# Patient Record
Sex: MALE | Race: BLACK OR AFRICAN AMERICAN | NOT HISPANIC OR LATINO | Employment: STUDENT | URBAN - METROPOLITAN AREA
[De-identification: names, ages, dates, MRNs, and addresses within clinical notes are randomized per-mention and may not be internally consistent; named-entity substitution may affect disease eponyms.]

---

## 2021-12-27 VITALS
WEIGHT: 150 LBS | RESPIRATION RATE: 16 BRPM | BODY MASS INDEX: 23.54 KG/M2 | DIASTOLIC BLOOD PRESSURE: 62 MMHG | SYSTOLIC BLOOD PRESSURE: 133 MMHG | HEART RATE: 109 BPM | HEIGHT: 67 IN | OXYGEN SATURATION: 98 %

## 2021-12-27 PROCEDURE — 99283 EMERGENCY DEPT VISIT LOW MDM: CPT

## 2021-12-27 RX ORDER — LEVOTHYROXINE SODIUM 100 UG/1
100 CAPSULE ORAL
COMMUNITY

## 2021-12-28 ENCOUNTER — HOSPITAL ENCOUNTER (EMERGENCY)
Facility: HOSPITAL | Age: 21
Discharge: HOME | End: 2021-12-28
Attending: EMERGENCY MEDICINE

## 2021-12-28 ENCOUNTER — APPOINTMENT (EMERGENCY)
Dept: RADIOLOGY | Facility: HOSPITAL | Age: 21
End: 2021-12-28

## 2021-12-28 DIAGNOSIS — S51.819A FOREARM LACERATION: Primary | ICD-10-CM

## 2021-12-28 PROCEDURE — 99284 EMERGENCY DEPT VISIT MOD MDM: CPT | Performed by: EMERGENCY MEDICINE

## 2021-12-28 PROCEDURE — 73110 X-RAY EXAM OF WRIST: CPT

## 2021-12-28 PROCEDURE — 12004 RPR S/N/AX/GEN/TRK7.6-12.5CM: CPT | Performed by: EMERGENCY MEDICINE

## 2021-12-28 RX ORDER — IBUPROFEN 400 MG/1
800 TABLET ORAL ONCE
Status: COMPLETED | OUTPATIENT
Start: 2021-12-28 | End: 2021-12-28

## 2021-12-28 RX ORDER — CEPHALEXIN 500 MG/1
500 CAPSULE ORAL ONCE
Status: COMPLETED | OUTPATIENT
Start: 2021-12-28 | End: 2021-12-28

## 2021-12-28 RX ORDER — CEPHALEXIN 500 MG/1
500 CAPSULE ORAL EVERY 6 HOURS SCHEDULED
Qty: 28 CAPSULE | Refills: 0 | Status: SHIPPED | OUTPATIENT
Start: 2021-12-28 | End: 2022-01-04

## 2021-12-28 RX ORDER — GINSENG 100 MG
2 CAPSULE ORAL ONCE
Status: COMPLETED | OUTPATIENT
Start: 2021-12-28 | End: 2021-12-28

## 2021-12-28 RX ORDER — LIDOCAINE HYDROCHLORIDE AND EPINEPHRINE 10; 10 MG/ML; UG/ML
20 INJECTION, SOLUTION INFILTRATION; PERINEURAL ONCE
Status: COMPLETED | OUTPATIENT
Start: 2021-12-28 | End: 2021-12-28

## 2021-12-28 RX ADMIN — BACITRACIN 2 SMALL APPLICATION: 500 OINTMENT TOPICAL at 02:11

## 2021-12-28 RX ADMIN — IBUPROFEN 800 MG: 400 TABLET, FILM COATED ORAL at 01:03

## 2021-12-28 RX ADMIN — CEPHALEXIN 500 MG: 500 CAPSULE ORAL at 02:16

## 2021-12-28 RX ADMIN — LIDOCAINE HYDROCHLORIDE,EPINEPHRINE BITARTRATE 20 ML: 10; .01 INJECTION, SOLUTION INFILTRATION; PERINEURAL at 01:18

## 2021-12-28 NOTE — ED NOTES
Left wrist splint applied pt reports verbal understanding of application of device        Erasto Jung RN  12/28/21 0115

## 2021-12-28 NOTE — ED PROVIDER NOTES
History  Chief Complaint   Patient presents with    Laceration     Patient reports he was skiing and lacerated his left hand        Large laceration over the left distal forearm  Another person's skis lacerated his forearm    No other injuries    Tetnus status: utd          History provided by:  Patient  Laceration  Location:  Shoulder/arm  Shoulder/arm laceration location:  L forearm  Length:  8  Depth:  Cutaneous  Bleeding: venous and controlled    Pain details:     Quality:  Aching  Foreign body present:  No foreign bodies  Relieved by:  Nothing  Worsened by: Movement  Tetanus status:  Up to date  Associated symptoms: no fever, no focal weakness, no rash, no redness, no swelling and no streaking        Prior to Admission Medications   Prescriptions Last Dose Informant Patient Reported? Taking? Levothyroxine Sodium 100 MCG CAPS   Yes No   Sig: Take 100 mg by mouth      Facility-Administered Medications: None       History reviewed  No pertinent past medical history  History reviewed  No pertinent surgical history  History reviewed  No pertinent family history  I have reviewed and agree with the history as documented  E-Cigarette/Vaping     E-Cigarette/Vaping Substances     Social History     Tobacco Use    Smoking status: Never Smoker    Smokeless tobacco: Never Used   Substance Use Topics    Alcohol use: Not Currently    Drug use: Not Currently       Review of Systems   Constitutional: Negative for fatigue and fever  Gastrointestinal: Negative for nausea and vomiting  Musculoskeletal: Negative for arthralgias, back pain, gait problem, myalgias, neck pain and neck stiffness  Skin: Positive for wound (large laceration over the Left forearm )  Negative for rash  Neurological: Negative for focal weakness, weakness, light-headedness, numbness and headaches  All other systems reviewed and are negative        Physical Exam  Physical Exam  Constitutional:       General: He is not in acute distress  Appearance: He is well-developed  He is not ill-appearing, toxic-appearing or diaphoretic  HENT:      Head: Normocephalic and atraumatic  Right Ear: External ear normal       Left Ear: External ear normal       Nose: Nose normal       Mouth/Throat:      Pharynx: No oropharyngeal exudate  Eyes:      General: No scleral icterus  Right eye: No discharge  Left eye: No discharge  Extraocular Movements: Extraocular movements intact  Conjunctiva/sclera: Conjunctivae normal       Pupils: Pupils are equal, round, and reactive to light  Neck:      Vascular: No JVD  Trachea: No tracheal deviation  Cardiovascular:      Rate and Rhythm: Normal rate and regular rhythm  Heart sounds: Normal heart sounds  No murmur heard  No friction rub  No gallop  Pulmonary:      Effort: Pulmonary effort is normal  No respiratory distress  Breath sounds: Normal breath sounds  No stridor  No wheezing, rhonchi or rales  Chest:      Chest wall: No tenderness  Abdominal:      General: Bowel sounds are normal  There is no distension  Palpations: Abdomen is soft  There is no mass  Tenderness: There is no abdominal tenderness  There is no right CVA tenderness, left CVA tenderness, guarding or rebound  Hernia: No hernia is present  Musculoskeletal:         General: Signs of injury (left distal forearm, dorsal, 8cm irregular laceration ) present  No tenderness or deformity  Normal range of motion  Cervical back: Normal range of motion and neck supple  No rigidity or tenderness  Right lower leg: No edema  Left lower leg: No edema  Lymphadenopathy:      Cervical: No cervical adenopathy  Skin:     General: Skin is warm  Capillary Refill: Capillary refill takes less than 2 seconds  Coloration: Skin is not jaundiced or pale  Findings: No bruising, erythema or rash  Neurological:      General: No focal deficit present        Mental Status: He is alert and oriented to person, place, and time  Mental status is at baseline  Cranial Nerves: No cranial nerve deficit  Sensory: No sensory deficit  Motor: No weakness or abnormal muscle tone  Coordination: Coordination normal       Gait: Gait normal    Psychiatric:         Mood and Affect: Mood normal          Behavior: Behavior normal          Thought Content: Thought content normal          Judgment: Judgment normal          Vital Signs  ED Triage Vitals [12/27/21 2129]   Temp Pulse Respirations Blood Pressure SpO2   -- (!) 109 16 133/62 98 %      Temp src Heart Rate Source Patient Position - Orthostatic VS BP Location FiO2 (%)   -- -- -- -- --      Pain Score       1           Vitals:    12/27/21 2129   BP: 133/62   Pulse: (!) 109         Visual Acuity      ED Medications  Medications   lidocaine-epinephrine (XYLOCAINE/EPINEPHRINE) 1 %-1:100,000 injection 20 mL (20 mL Infiltration Given by Other 12/28/21 0118)   ibuprofen (MOTRIN) tablet 800 mg (800 mg Oral Given 12/28/21 0103)   bacitracin topical ointment 2 small application (2 small application Topical Given 12/28/21 0211)   cephalexin (KEFLEX) capsule 500 mg (500 mg Oral Given 12/28/21 0216)       Diagnostic Studies  Results Reviewed     None                 XR wrist 3+ views LEFT   ED Interpretation by David Pineda MD (12/28 0251)   NO ACUTE FINDINGS  NO FX      Final Result by Frances Infante DO (12/28 2144)      No acute osseous abnormality  Dorsal soft tissue swelling/laceration        Workstation performed: [id number]                    Procedures  Procedures         ED Course  ED Course as of 01/04/22 0443   Tue Dec 28, 2021   0230 Following copious irrigation ,pt consented to injection w/ lidocain and lac repair  21 sutures placed  Pt tolerated well   0252 X ray wnl  Will dc w/ Splint                                             MDM    Disposition  Final diagnoses:   Forearm laceration     Time reflects when diagnosis was documented in both MDM as applicable and the Disposition within this note     Time User Action Codes Description Comment    12/28/2021  2:53 AM Chaz Robleslon Add [Q65 [id number]] Forearm laceration       ED Disposition     ED Disposition Condition Date/Time Comment    Discharge Stable Tue Dec 28, 2021  2:53 AM Parth Benjamin discharge to home/self care  Follow-up Information    None         Discharge Medication List as of 12/28/2021  3:03 AM      START taking these medications    Details   cephalexin (KEFLEX) 500 mg capsule Take 1 capsule (500 mg total) by mouth every 6 (six) hours for 7 days, Starting Tue 12/28/2021, Until Tue 1/4/2022, Normal         CONTINUE these medications which have NOT CHANGED    Details   Levothyroxine Sodium 100 MCG CAPS Take 100 mg by mouth, Historical Med             No discharge procedures on file      PDMP Review     None          ED Provider  Electronically Signed by           Jamaal Sanchez MD  01/04/22 4486

## 2021-12-28 NOTE — ED PROCEDURE NOTE
PROCEDURE  Laceration repair    Date/Time: 12/28/2021 2:30 AM  Performed by: Neema Vides MD  Authorized by: Neema Vides MD   Consent: Verbal consent obtained  Risks and benefits: risks, benefits and alternatives were discussed  Consent given by: patient  Patient understanding: patient states understanding of the procedure being performed  Patient consent: the patient's understanding of the procedure matches consent given  Patient identity confirmed: verbally with patient  Time out: Immediately prior to procedure a "time out" was called to verify the correct patient, procedure, equipment, support staff and site/side marked as required  Body area: upper extremity  Laceration length: 8 cm  Contaminated: none   Foreign bodies: no foreign bodies  Tendon involvement: none  Nerve involvement: none  Vascular damage: no  Anesthesia: local infiltration    Anesthesia:  Local Anesthetic: lidocaine 1% with epinephrine  Anesthetic total: 12 mL    Wound Dehiscence:    Secondary closure or dehiscence: complex    Procedure Details:  Preparation: Patient was prepped and draped in the usual sterile fashion    Irrigation solution: saline  Amount of cleaning: extensive  Skin closure: Ethilon (4-0 and 6-0)  Number of sutures: 21  Technique: simple  Approximation: close  Approximation difficulty: simple  Dressing: 4x4 sterile gauze and antibiotic ointment  Patient tolerance: patient tolerated the procedure well with no immediate complications           Neema Vides MD  01/04/22 [id number]

## 2021-12-28 NOTE — DISCHARGE INSTRUCTIONS
Return if:   Increased pain or redness or swelling  Fever or flu like symptoms  Or any new and concerning symptoms    By Tomorrow night you can take the ER dressing off and begin to perform dressing changes every day  Please apply triple antibiotic cream every 12 hours    During this application please examine the wound for any signs of infection: redness and tenderness are concerning signs, as well as any discharge from the wound - if any of these signs preset, please have urgent wound reevaluation      Please have sutures removed in 7-10 days        PATIENT SURVEY:   Thank you for your visit today  Your satisfaction is very very important to us  Anahi Woo for choosing [facility] for your ER care  If you get a survey in the mail please rate your service as VERY GOOD (other ratings lower than this are actually detrimental) - This helps our team to continue providing excellent care - Anahi Woo